# Patient Record
Sex: MALE | Race: BLACK OR AFRICAN AMERICAN | Employment: FULL TIME | URBAN - METROPOLITAN AREA
[De-identification: names, ages, dates, MRNs, and addresses within clinical notes are randomized per-mention and may not be internally consistent; named-entity substitution may affect disease eponyms.]

---

## 2020-01-25 ENCOUNTER — APPOINTMENT (OUTPATIENT)
Dept: CT IMAGING | Facility: CLINIC | Age: 40
End: 2020-01-25
Attending: EMERGENCY MEDICINE
Payer: COMMERCIAL

## 2020-01-25 ENCOUNTER — HOSPITAL ENCOUNTER (EMERGENCY)
Facility: CLINIC | Age: 40
Discharge: HOME | End: 2020-01-25
Attending: EMERGENCY MEDICINE | Admitting: EMERGENCY MEDICINE
Payer: COMMERCIAL

## 2020-01-25 ENCOUNTER — APPOINTMENT (OUTPATIENT)
Dept: ULTRASOUND IMAGING | Facility: CLINIC | Age: 40
End: 2020-01-25
Attending: EMERGENCY MEDICINE
Payer: COMMERCIAL

## 2020-01-25 VITALS
BODY MASS INDEX: 32.78 KG/M2 | OXYGEN SATURATION: 99 % | WEIGHT: 242 LBS | DIASTOLIC BLOOD PRESSURE: 70 MMHG | TEMPERATURE: 98.4 F | SYSTOLIC BLOOD PRESSURE: 142 MMHG | RESPIRATION RATE: 18 BRPM | HEART RATE: 82 BPM | HEIGHT: 72 IN

## 2020-01-25 DIAGNOSIS — K76.0 FATTY LIVER: ICD-10-CM

## 2020-01-25 DIAGNOSIS — R07.9 CHEST PAIN, UNSPECIFIED TYPE: ICD-10-CM

## 2020-01-25 DIAGNOSIS — J98.11 ATELECTASIS: ICD-10-CM

## 2020-01-25 LAB
ALBUMIN SERPL-MCNC: 3.6 G/DL (ref 3.4–5)
ALP SERPL-CCNC: 61 U/L (ref 40–150)
ALT SERPL W P-5'-P-CCNC: 41 U/L (ref 0–70)
ANION GAP SERPL CALCULATED.3IONS-SCNC: 3 MMOL/L (ref 3–14)
AST SERPL W P-5'-P-CCNC: 32 U/L (ref 0–45)
BASOPHILS # BLD AUTO: 0 10E9/L (ref 0–0.2)
BASOPHILS NFR BLD AUTO: 0.2 %
BILIRUB SERPL-MCNC: 0.5 MG/DL (ref 0.2–1.3)
BUN SERPL-MCNC: 17 MG/DL (ref 7–30)
CALCIUM SERPL-MCNC: 9.6 MG/DL (ref 8.5–10.1)
CHLORIDE SERPL-SCNC: 101 MMOL/L (ref 94–109)
CO2 SERPL-SCNC: 32 MMOL/L (ref 20–32)
CREAT SERPL-MCNC: 1.11 MG/DL (ref 0.66–1.25)
DIFFERENTIAL METHOD BLD: ABNORMAL
EOSINOPHIL # BLD AUTO: 0.2 10E9/L (ref 0–0.7)
EOSINOPHIL NFR BLD AUTO: 2.9 %
ERYTHROCYTE [DISTWIDTH] IN BLOOD BY AUTOMATED COUNT: 13.2 % (ref 10–15)
GFR SERPL CREATININE-BSD FRML MDRD: 83 ML/MIN/{1.73_M2}
GLUCOSE SERPL-MCNC: 92 MG/DL (ref 70–99)
HCT VFR BLD AUTO: 47 % (ref 40–53)
HGB BLD-MCNC: 14.6 G/DL (ref 13.3–17.7)
IMM GRANULOCYTES # BLD: 0 10E9/L (ref 0–0.4)
IMM GRANULOCYTES NFR BLD: 0.2 %
LIPASE SERPL-CCNC: 60 U/L (ref 73–393)
LYMPHOCYTES # BLD AUTO: 1.8 10E9/L (ref 0.8–5.3)
LYMPHOCYTES NFR BLD AUTO: 31.4 %
MCH RBC QN AUTO: 25.7 PG (ref 26.5–33)
MCHC RBC AUTO-ENTMCNC: 31.1 G/DL (ref 31.5–36.5)
MCV RBC AUTO: 83 FL (ref 78–100)
MONOCYTES # BLD AUTO: 0.3 10E9/L (ref 0–1.3)
MONOCYTES NFR BLD AUTO: 5.9 %
NEUTROPHILS # BLD AUTO: 3.3 10E9/L (ref 1.6–8.3)
NEUTROPHILS NFR BLD AUTO: 59.4 %
NRBC # BLD AUTO: 0 10*3/UL
NRBC BLD AUTO-RTO: 0 /100
NT-PROBNP SERPL-MCNC: 10 PG/ML (ref 0–450)
PLATELET # BLD AUTO: 290 10E9/L (ref 150–450)
POTASSIUM SERPL-SCNC: 3.4 MMOL/L (ref 3.4–5.3)
PROT SERPL-MCNC: 8.4 G/DL (ref 6.8–8.8)
RBC # BLD AUTO: 5.67 10E12/L (ref 4.4–5.9)
SODIUM SERPL-SCNC: 136 MMOL/L (ref 133–144)
TROPONIN I SERPL-MCNC: 0.03 UG/L (ref 0–0.04)
TROPONIN I SERPL-MCNC: 0.03 UG/L (ref 0–0.04)
WBC # BLD AUTO: 5.6 10E9/L (ref 4–11)

## 2020-01-25 PROCEDURE — 93005 ELECTROCARDIOGRAM TRACING: CPT

## 2020-01-25 PROCEDURE — 71275 CT ANGIOGRAPHY CHEST: CPT

## 2020-01-25 PROCEDURE — 99285 EMERGENCY DEPT VISIT HI MDM: CPT | Mod: 25

## 2020-01-25 PROCEDURE — 80053 COMPREHEN METABOLIC PANEL: CPT | Performed by: EMERGENCY MEDICINE

## 2020-01-25 PROCEDURE — 25000125 ZZHC RX 250: Performed by: EMERGENCY MEDICINE

## 2020-01-25 PROCEDURE — 84484 ASSAY OF TROPONIN QUANT: CPT | Performed by: EMERGENCY MEDICINE

## 2020-01-25 PROCEDURE — 85025 COMPLETE CBC W/AUTO DIFF WBC: CPT | Performed by: EMERGENCY MEDICINE

## 2020-01-25 PROCEDURE — 83880 ASSAY OF NATRIURETIC PEPTIDE: CPT | Performed by: EMERGENCY MEDICINE

## 2020-01-25 PROCEDURE — 83690 ASSAY OF LIPASE: CPT | Performed by: EMERGENCY MEDICINE

## 2020-01-25 PROCEDURE — 25000128 H RX IP 250 OP 636: Performed by: EMERGENCY MEDICINE

## 2020-01-25 PROCEDURE — 76705 ECHO EXAM OF ABDOMEN: CPT

## 2020-01-25 PROCEDURE — 93005 ELECTROCARDIOGRAM TRACING: CPT | Mod: 76

## 2020-01-25 RX ORDER — IOPAMIDOL 755 MG/ML
500 INJECTION, SOLUTION INTRAVASCULAR ONCE
Status: COMPLETED | OUTPATIENT
Start: 2020-01-25 | End: 2020-01-25

## 2020-01-25 RX ADMIN — SODIUM CHLORIDE 90 ML: 9 INJECTION, SOLUTION INTRAVENOUS at 20:04

## 2020-01-25 RX ADMIN — IOPAMIDOL 73 ML: 755 INJECTION, SOLUTION INTRAVENOUS at 20:04

## 2020-01-25 ASSESSMENT — ENCOUNTER SYMPTOMS
NAUSEA: 0
VOMITING: 0
ABDOMINAL PAIN: 0
COUGH: 0
SHORTNESS OF BREATH: 0
FEVER: 0

## 2020-01-25 ASSESSMENT — MIFFLIN-ST. JEOR: SCORE: 2050.7

## 2020-01-25 NOTE — ED AVS SNAPSHOT
[facility] Emergency Department  [address]  [facility] [address]  Phone:  [phone]  Fax:  [phone]                                    Trent Smith   MRN: [id number]    Department:  [facility] Emergency Department   Date of Visit:  1/25/2020           After Visit Summary Signature Page    I have received my discharge instructions, and my questions have been answered. I have discussed any challenges I see with this plan with the nurse or doctor.    ..........................................................................................................................................  Patient/Patient Representative Signature      ..........................................................................................................................................  Patient Representative Print Name and Relationship to Patient    ..................................................               ................................................  Date                                   Time    ..........................................................................................................................................  Reviewed by Signature/Title    ...................................................              ..............................................  Date                                               Time          22EPIC Rev 08/18

## 2020-01-26 LAB
INTERPRETATION ECG - MUSE: NORMAL
INTERPRETATION ECG - MUSE: NORMAL

## 2020-01-26 NOTE — ED TRIAGE NOTES
Pt reports rt side pain x 10 days. Pt went to urgent care and primary care. Pt started on prednisone for diagnosis of inflammation of the rib which helped at first. Today is day 4 of prednisone. Now pain is worse again. Pt taking Tylenol and Ibuprofen as well, last dose of either was yesterday. Intensity of pain comes and goes, sometimes it gets worse with no reason, movement and deep breathing make pain worse.

## 2020-01-26 NOTE — ED PROVIDER NOTES
History     Chief Complaint:   Rib Pain    HPI   Trent Smith is a 40 year old, otherwise healthy male, approximately two months s/p left ACL repair, who presents with for evaluation of right sided rib pain. Ten days ago, he reports a sudden onset of this right rib pain. Initially, it was mild but it was persistent, so he presented to Urgent Care where he underwent a chest x-ray which was unremarkable and was told to alternate Tylenol and ibuprofen for the pain. Three days ago, he had an appointment with his primary care physician at [facility] to discuss the persistent pain. At this time, he was prescribed prednisone due to the provider's concern for costochondritis. Initially, he reports the pain improved with this medication however it worsened today started around 0100 when he awoke from sleep with the pain and additional diaphoresis. It took him awhile to find a comfortable position at this time. When he woke up to go to work this morning, the pain was still present but he felt well enough to go to work. Throughout the day, the pain gradually worsened. This evening, the patient experienced an acute exacerbation just going from sitting on a couch to standing, thus prompting presentation to the ED. He does endorse current pain to his right rib area. It is exacerbated with movement and taking deep breaths. He denies any dyspnea, cough, lower extremity pain or swelling, nausea, vomiting, or fevers. He also denies any direct trauma to the area of pain. He has no recent sick contacts or long travel by car or plane. He denies a personal history of cancer, blood clots, family history early MI, and also denies tobacco or alcohol use. He does admit to ACL repair in November.      Allergies:  NKDA     Medications:    The patient is currently on no regular medications.      Past Medical History:    The patient denies any significant past medical history.    Past Surgical History:    Left ACL  repair  Family cial History:    No past pertinent family history.    Social History:  Presents to the ED with his wife  Negative for tobacco use.  Negative for alcohol use.    Review of Systems   Constitutional: Negative for fever.   Respiratory: Negative for cough and shortness of breath.    Cardiovascular: Positive for chest pain (right). Negative for leg swelling.   Gastrointestinal: Negative for abdominal pain, nausea and vomiting.   All other systems reviewed and are negative.      Physical Exam     Patient Vitals for the past 24 hrs:   BP Temp Temp src Pulse Resp SpO2 Height Weight   01/25/20 1851 (!) 149/69 98.2  F (36.8  C) Oral 88 18 100 % 1.829 m (6') 109.8 kg (242 lb)      Physical Exam  Nursing note and vitals reviewed.  Constitutional: Well nourished. Resting comfortably.   Eyes: Conjunctiva normal.  Pupils are equal, round, and reactive to light.   ENT: Nose normal. Mucous membranes pink and moist.    Neck: Normal range of motion.  CVS: Normal rate, regular rhythm.  Normal heart sounds.  No murmur.  Pulmonary: Lungs clear to auscultation bilaterally. No wheezes/rales/rhonchi.  GI: Abdomen soft. Mild RUQ tenderness. No rigidity or guarding.    MSK: No calf tenderness or swelling.  Neuro: Alert. Follows simple commands.  Skin: Skin is warm and dry. No rash noted.   Psychiatric: Normal affect.     Emergency Department Course   ECG:  Indication: Chest Pain  Time: 2056  Vent. Rate 77 bpm. VT interval 150. QRS duration 90. QT/QTc 360/407. P-R-T axis 65 -4 10.    Normal sinus rhythm. Normal ECG. Read time: 2056.    Indication: Repeat  Time: 2235  Vent. Rate 81 bpm. VT interval 150. QRS duration 94. QT/QTc 342/397. P-R-T axis 58 -18 7.    Normal sinus rhythm. Normal ECG. No significant change compared to EKG dated 1/25/20. Read time: 2235.    Imaging:  Radiographic findings were communicated with the patient who voiced understanding of the findings.  CT Chest w/ IV contrast:   1.  Negative for pulmonary  embolism.  2.  Right pleural accumulation with subjacent right lower lobe atelectasis, as per radiology.    US Abdomen, RUQ:  Fatty liver. No acute abnormality, as per radiology.      Laboratory:  CBC: WBC: 5.6, HGB: 14.6, PLT: 290  CMP: all WNL (Creatinine: 1.11)  Lipase: 60 (L)  1934 Troponin: 0.025   2145 Troponin: 0.029   BNP: 10    Procedures:  None    Emergency Department Course:  Nursing notes and vitals reviewed.   1920: I performed an exam of the patient as documented above.      IV was inserted and blood was drawn for laboratory testing, results above.   The patient was sent for a chest CT and RUQ ultrasound while in the emergency department, results above.    EKG obtained in the ED, see results above.      2040: I rechecked the patient and discussed the results of his workup thus far.     Repeat EKG obtained in the ED, see results above.      2230: I updated the patient with the results of his repeat troponin and EKG. He would like to go home.     Findings and plan explained to the Patient. Patient discharged home with instructions regarding supportive care, medications, and reasons to return. The importance of close follow-up was reviewed. The patient was provided an outpatient stress test order.     I personally reviewed the laboratory and imaging results with the Patient and answered all related questions prior to discharge.    Impression & Plan      Medical Decision Making:  HEART Score:    Predicts Major Adverse Cardiac Events within 6 weeks:    History:   Highly suspicious:  2   Moderately suspicious: 1   Slightly/Non-suspicious: 0  ECG:   Significant ST depression 2   Nonspecific repolarization 1   Normal    0  Age:    >=65    2   >45-<65   1   <= 45    0  Risk Factors [DM, Current or recent smoker, HTN, HLP, FMH CAD, Obesity]   >=3 or Hx. CAD  2   1-2    1   None    0  Troponin:   >= 3x normal   2   >1 to <3x normal  1   Normal    0    This Patient's Score:   1    Interpretation:    0-3:    2.5%  risk of MACE (may consider D/C)   4-6:    20.3% (Observation)   7-10:    72.7% (Admit, consider early invasive strategy)        Trent Smith is a 40 year old male presenting for reported chest pain/right-sided rib pain.  Patient nontoxic, no respiratory distress on arrival.  Serial EKGs without focal ischemia or underlying arrhythmia.  Patient's troponins on the higher end of normal and repeat slightly up trended.  He is ultimately low risk for ACS and his presentation would be atypical of ACS.  I did however offer observation for stress test given patient's presentation though he is declining at this point in time.  He expressed understanding of Mr. delayed diagnoses as well as complications including but not limited to death.  He has capacity to make his own medical decisions.  Patient did also undergo formal CT which was fortunately negative for PE, fluid overload nonspecific.  Mild right atelectasis visualized.  Patient's lab work reassuring without profound anemia or significant electrolyte derangements.  He does not appear fluid overloaded. Patient also underwent a RUQ ultrasound given pain on exam though no evidence to suggest cholelithiasis/cholecystitis.  Incidental fatty liver found and discussed with patient. I discussed with patient unknown etiology to explain his pain though do feel that he would benefit from close outpatient follow-up and stress test which I have ordered today.  He plans to continue his prednisone as well as NSAIDs given concern for costochondritis though I stated I am unsure if this is the exact etiology of patient's underlying presentation.  All questions addressed, return precautions given.      Diagnosis:    ICD-10-CM    1. Chest pain, unspecified type R07.9 NM Exercise stress test   2. Fatty liver K76.0    3. Atelectasis J98.11        Disposition:  discharged to home    Scribe Disclosure:  I, Sharifa Dahl, am serving as a scribe on 1/25/2020 at 7:20 PM to personally document  services performed by Azul Killian DO based on my observations and the provider's statements to me.      1/25/2020   [facility] EMERGENCY DEPARTMENT       Azul Killian DO  01/25/20 6111

## 2020-12-07 DIAGNOSIS — Z87.42 HISTORY OF INFERTILITY: Primary | ICD-10-CM

## 2020-12-18 DIAGNOSIS — Z87.42 HISTORY OF INFERTILITY: ICD-10-CM

## 2020-12-18 PROCEDURE — 89322 SEMEN ANAL STRICT CRITERIA: CPT

## 2020-12-22 LAB
ABNORMAL SPERM: 96 MORPHOLOGY
ABSTINENCE DAYS: 5 DAYS (ref 2–7)
AGGLUTINATION: NO YES/NO
ANALYSIS TEMP - CENTIGRADE: 23 CENTIGRADE
CELL FRAGMENTS: NORMAL %
COLLECTION METHOD: NORMAL
COLLECTION SITE: NORMAL
CONSENT TO RELEASE TO PARTNER: NO
HEAD DEFECT: 97
IMMATURE SPERM: NORMAL %
IMMOTILE: 46 %
LAB RECEIPT TIME: NORMAL
LIQUEFIED: YES YES/NO
MIDPIECE DEFECT: 62
NON-PROGRESSIVE MOTILITY: 5 %
NORMAL SPERM: 4 % NORMAL FORMS (ref 4–?)
PROGRESSIVE MOTILITY: 49 % (ref 32–?)
ROUND CELLS: 0.5 MILLION/ML (ref ?–2)
SPECIMEN CONCENTRATION: 152 MILLION/ML (ref 15–?)
SPECIMEN PH: 7.6 PH (ref 7.2–?)
SPECIMEN TYPE: NORMAL
SPECIMEN VOL UR: 4.1 ML (ref 1.5–?)
TAIL DEFECT: 24
TIME OF ANALYSIS: NORMAL
TOTAL NUMBER: 623 MILLION (ref 39–?)
TOTAL PROGRESSIVE MOTILE: 305 MILLION (ref 15.6–?)
VISCOUS: NO YES/NO
VITALITY: NORMAL % (ref 58–?)
WBC SPECIMEN: NORMAL %

## 2021-01-04 ENCOUNTER — HEALTH MAINTENANCE LETTER (OUTPATIENT)
Age: 41
End: 2021-01-04

## 2021-06-07 PROCEDURE — 88173 CYTOPATH EVAL FNA REPORT: CPT | Performed by: OTOLARYNGOLOGY

## 2021-06-07 PROCEDURE — 88184 FLOWCYTOMETRY/ TC 1 MARKER: CPT | Performed by: OTOLARYNGOLOGY

## 2021-06-07 PROCEDURE — 36415 COLL VENOUS BLD VENIPUNCTURE: CPT | Performed by: OTOLARYNGOLOGY

## 2021-06-07 PROCEDURE — 88185 FLOWCYTOMETRY/TC ADD-ON: CPT | Performed by: OTOLARYNGOLOGY

## 2021-10-08 ENCOUNTER — OFFICE VISIT (OUTPATIENT)
Dept: HEMATOLOGY/ONCOLOGY | Facility: HOSPITAL | Age: 41
End: 2021-10-08
Attending: INTERNAL MEDICINE
Payer: COMMERCIAL

## 2021-10-08 VITALS
HEART RATE: 78 BPM | BODY MASS INDEX: 34.27 KG/M2 | SYSTOLIC BLOOD PRESSURE: 133 MMHG | TEMPERATURE: 98 F | DIASTOLIC BLOOD PRESSURE: 85 MMHG | HEIGHT: 72.01 IN | WEIGHT: 253 LBS | OXYGEN SATURATION: 99 % | RESPIRATION RATE: 16 BRPM

## 2021-10-08 DIAGNOSIS — R59.0 CERVICAL ADENOPATHY: ICD-10-CM

## 2021-10-08 DIAGNOSIS — D72.818 OTHER DECREASED WHITE BLOOD CELL (WBC) COUNT: Primary | ICD-10-CM

## 2021-10-08 PROCEDURE — 99244 OFF/OP CNSLTJ NEW/EST MOD 40: CPT | Performed by: INTERNAL MEDICINE

## 2021-10-08 NOTE — PROGRESS NOTES
Education Record    Learner:  Patient    Disease / Wenda Modest blood work    Barriers / Limitations:  None   Comments:    Method:  Discussion   Comments:    General Topics:  Plan of care reviewed   Comments:    Outcome:  Shows understanding   Commen

## 2021-10-08 NOTE — CONSULTS
[facility] Report of Consultation    Patient Name: Thalia Villasenor   YOB: 1980   Medical Record Number: [id number]   CSN: [id number]   Consulting Physician: Alejandra Augustine MD  Referring Physician(s):  Lo Graham MD  Date of Consultation 6/7/2021 08:24     Status: Final result     Dx: Lymphadenopathy of left cervical region     2 Result Notes    Component 6/7/21 0824   Flow Cytometry Specimen Lymph node ([id number])    INDICATION FOR FLOW Flow cytometry performed at [facility] is inte Use      Smoking status: Never Smoker      Smokeless tobacco: Never Used    Vaping Use      Vaping Use: Never used    Substance and Sexual Activity      Alcohol use: Not Currently      Drug use: Never      Sexual activity: Not on file    Other Topics Patient Position: Sitting, Cuff Size: large)   Pulse 78   Temp 97.5 °F (36.4 °C) (Temporal)   Resp 16   Ht 1.829 m (6' 0.01\")   Wt 114.8 kg (253 lb)   SpO2 99%   BMI 34.31 kg/m²     Physical Examination:  General: Patient is alert and oriented x 3, not in Protein 8.3 04/20/2021 1454             Radiology:  DATE OF SERVICE: 05.21.2021     CLINICAL INDICATION:37years-old Male with  Lymphadenopathy of left cervical region.    TECHNIQUE: Post contrast only axial images of the neck were obtained.  Coronal and sa Mildly prominent LEFT jugulodigastric level 2 lymph node measuring 14 mm. There are nonenlarged submandibular and RIGHT jugular chain lymph nodes. NO necrotic lymphadenopathy is identified.    NO supraclavicular, retropharyngeal or intraparotid lympha nodes seen in the inferior   LEFT neck, with evidence of mild clustering. NO necrotic lymphadenopathy is detected. 3. NO masses are seen within the visualized portions of the aerodigestive tract. Clinicians:  If you have any questions or concerns regard

## 2021-10-10 ENCOUNTER — HEALTH MAINTENANCE LETTER (OUTPATIENT)
Age: 41
End: 2021-10-10

## 2021-10-23 ENCOUNTER — LAB ENCOUNTER (OUTPATIENT)
Dept: LAB | Facility: HOSPITAL | Age: 41
End: 2021-10-23
Attending: Other
Payer: COMMERCIAL

## 2021-10-23 DIAGNOSIS — G40.009 LOCALIZATION-RELATED (FOCAL) (PARTIAL) IDIOPATHIC EPILEPSY AND EPILEPTIC SYNDROMES WITH SEIZURES OF LOCALIZED ONSET, NOT INTRACTABLE, WITHOUT STATUS EPILEPTICUS (HCC): ICD-10-CM

## 2021-10-23 DIAGNOSIS — G40.909 NOCTURNAL EPILEPSY (HCC): ICD-10-CM

## 2021-10-23 PROCEDURE — 80177 DRUG SCRN QUAN LEVETIRACETAM: CPT

## 2021-10-23 PROCEDURE — 36415 COLL VENOUS BLD VENIPUNCTURE: CPT

## 2021-10-23 PROCEDURE — 82306 VITAMIN D 25 HYDROXY: CPT

## 2021-11-01 ENCOUNTER — APPOINTMENT (OUTPATIENT)
Dept: HEMATOLOGY/ONCOLOGY | Facility: HOSPITAL | Age: 41
End: 2021-11-01
Attending: INTERNAL MEDICINE
Payer: COMMERCIAL

## 2022-01-30 ENCOUNTER — HEALTH MAINTENANCE LETTER (OUTPATIENT)
Age: 42
End: 2022-01-30

## 2022-09-18 ENCOUNTER — HEALTH MAINTENANCE LETTER (OUTPATIENT)
Age: 42
End: 2022-09-18

## 2023-05-08 ENCOUNTER — HEALTH MAINTENANCE LETTER (OUTPATIENT)
Age: 43
End: 2023-05-08